# Patient Record
Sex: FEMALE | Race: WHITE | NOT HISPANIC OR LATINO | Employment: OTHER | URBAN - METROPOLITAN AREA
[De-identification: names, ages, dates, MRNs, and addresses within clinical notes are randomized per-mention and may not be internally consistent; named-entity substitution may affect disease eponyms.]

---

## 2017-06-08 ENCOUNTER — HISTORICAL (OUTPATIENT)
Dept: LAB | Facility: HOSPITAL | Age: 67
End: 2017-06-08

## 2017-06-08 LAB — CALCIUM SERPL-MCNC: 9.1 MG/DL (ref 8.5–10.1)

## 2017-06-20 ENCOUNTER — HISTORICAL (OUTPATIENT)
Dept: INFUSION THERAPY | Facility: HOSPITAL | Age: 67
End: 2017-06-20

## 2017-11-09 ENCOUNTER — HISTORICAL (OUTPATIENT)
Dept: LAB | Facility: HOSPITAL | Age: 67
End: 2017-11-09

## 2017-11-09 LAB
ALBUMIN SERPL-MCNC: 3.9 GM/DL (ref 3.4–5)
ALP SERPL-CCNC: 56 UNIT/L (ref 46–116)
ALT SERPL-CCNC: 21 UNIT/L (ref 12–78)
AST SERPL-CCNC: 18 UNIT/L (ref 15–37)
BILIRUB SERPL-MCNC: 0.6 MG/DL (ref 0.2–1)
BILIRUBIN DIRECT+TOT PNL SERPL-MCNC: 0.15 MG/DL (ref 0–0.2)
BILIRUBIN DIRECT+TOT PNL SERPL-MCNC: 0.45 MG/DL (ref 0–0.8)
BUN SERPL-MCNC: 14.3 MG/DL (ref 7–18)
CALCIUM SERPL-MCNC: 9 MG/DL (ref 8.5–10.1)
CHLORIDE SERPL-SCNC: 108 MMOL/L (ref 98–107)
CHOLEST SERPL-MCNC: 174 MG/DL (ref 0–200)
CHOLEST/HDLC SERPL: 2.2 {RATIO} (ref 0–4)
CO2 SERPL-SCNC: 31.1 MMOL/L (ref 21–32)
CREAT SERPL-MCNC: 0.71 MG/DL (ref 0.6–1.3)
DEPRECATED CALCIDIOL+CALCIFEROL SERPL-MC: 27.46 NG/ML (ref 30–80)
ERYTHROCYTE [DISTWIDTH] IN BLOOD BY AUTOMATED COUNT: 12.8 % (ref 11.5–17)
FT4I SERPL CALC-MCNC: 2.34
GLUCOSE SERPL-MCNC: 76 MG/DL (ref 74–106)
HCT VFR BLD AUTO: 42.9 % (ref 37–47)
HDLC SERPL-MCNC: 79 MG/DL (ref 40–60)
HGB BLD-MCNC: 14.2 GM/DL (ref 12–16)
LDLC SERPL CALC-MCNC: 85 MG/DL (ref 0–129)
MCH RBC QN AUTO: 30.4 PG (ref 27–31)
MCHC RBC AUTO-ENTMCNC: 33.1 GM/DL (ref 33–36)
MCV RBC AUTO: 91.8 FL (ref 80–94)
PLATELET # BLD AUTO: 202 X10(3)/MCL (ref 130–400)
PMV BLD AUTO: 10 FL (ref 7.4–10.4)
POTASSIUM SERPL-SCNC: 4.5 MMOL/L (ref 3.5–5.1)
PROT SERPL-MCNC: 6.7 GM/DL (ref 6.4–8.2)
RBC # BLD AUTO: 4.67 X10(6)/MCL (ref 4.2–5.4)
SODIUM SERPL-SCNC: 145 MMOL/L (ref 136–145)
T3RU NFR SERPL: 32 % (ref 31–39)
T4 SERPL-MCNC: 7.3 MCG/DL (ref 4.7–13.3)
TRIGL SERPL-MCNC: 50 MG/DL
TSH SERPL-ACNC: 3.05 MIU/ML (ref 0.36–3.74)
VLDLC SERPL CALC-MCNC: 10 MG/DL
WBC # SPEC AUTO: 4.6 X10(3)/MCL (ref 4.5–11.5)

## 2017-12-26 ENCOUNTER — HISTORICAL (OUTPATIENT)
Dept: INFUSION THERAPY | Facility: HOSPITAL | Age: 67
End: 2017-12-26

## 2017-12-28 ENCOUNTER — HISTORICAL (OUTPATIENT)
Dept: RADIOLOGY | Facility: HOSPITAL | Age: 67
End: 2017-12-28

## 2018-07-26 ENCOUNTER — HISTORICAL (OUTPATIENT)
Dept: LAB | Facility: HOSPITAL | Age: 68
End: 2018-07-26

## 2018-07-26 LAB — CALCIUM SERPL-MCNC: 9 MG/DL (ref 8.5–10.1)

## 2018-08-16 ENCOUNTER — HISTORICAL (OUTPATIENT)
Dept: INFUSION THERAPY | Facility: HOSPITAL | Age: 68
End: 2018-08-16

## 2019-01-10 ENCOUNTER — HISTORICAL (OUTPATIENT)
Dept: LAB | Facility: HOSPITAL | Age: 69
End: 2019-01-10

## 2019-01-10 LAB
ALBUMIN SERPL-MCNC: 3.8 GM/DL (ref 3.4–5)
ALP SERPL-CCNC: 56 UNIT/L (ref 46–116)
ALT SERPL-CCNC: 21 UNIT/L (ref 12–78)
AST SERPL-CCNC: 21 UNIT/L (ref 15–37)
BILIRUB SERPL-MCNC: 0.6 MG/DL (ref 0.2–1)
BILIRUBIN DIRECT+TOT PNL SERPL-MCNC: 0.16 MG/DL (ref 0–0.2)
BILIRUBIN DIRECT+TOT PNL SERPL-MCNC: 0.44 MG/DL (ref 0–0.8)
BUN SERPL-MCNC: 12.4 MG/DL (ref 7–18)
CALCIUM SERPL-MCNC: 9.1 MG/DL (ref 8.5–10.1)
CHLORIDE SERPL-SCNC: 106 MMOL/L (ref 98–107)
CHOLEST SERPL-MCNC: 165 MG/DL (ref 0–200)
CHOLEST/HDLC SERPL: 2 {RATIO} (ref 0–4)
CO2 SERPL-SCNC: 30.7 MMOL/L (ref 21–32)
CREAT SERPL-MCNC: 0.65 MG/DL (ref 0.6–1.3)
CREAT/UREA NIT SERPL: 19
DEPRECATED CALCIDIOL+CALCIFEROL SERPL-MC: 37.83 NG/ML (ref 30–80)
ERYTHROCYTE [DISTWIDTH] IN BLOOD BY AUTOMATED COUNT: 12.9 % (ref 11.5–17)
FT4I SERPL CALC-MCNC: 2.45
GLUCOSE SERPL-MCNC: 85 MG/DL (ref 74–106)
HCT VFR BLD AUTO: 42.9 % (ref 37–47)
HDLC SERPL-MCNC: 81 MG/DL (ref 40–60)
HGB BLD-MCNC: 13.7 GM/DL (ref 12–16)
LDLC SERPL CALC-MCNC: 75 MG/DL (ref 0–129)
MCH RBC QN AUTO: 29.8 PG (ref 27–31)
MCHC RBC AUTO-ENTMCNC: 31.9 GM/DL (ref 33–36)
MCV RBC AUTO: 93.5 FL (ref 80–94)
PLATELET # BLD AUTO: 213 X10(3)/MCL (ref 130–400)
PMV BLD AUTO: 12 FL (ref 9.4–12.4)
POTASSIUM SERPL-SCNC: 4.5 MMOL/L (ref 3.5–5.1)
PROT SERPL-MCNC: 6.7 GM/DL (ref 6.4–8.2)
RBC # BLD AUTO: 4.59 X10(6)/MCL (ref 4.2–5.4)
SODIUM SERPL-SCNC: 144 MMOL/L (ref 136–145)
T3RU NFR SERPL: 34 % (ref 31–39)
T4 SERPL-MCNC: 7.2 MCG/DL (ref 4.7–13.3)
TRIGL SERPL-MCNC: 44 MG/DL
TSH SERPL-ACNC: 2.53 MIU/ML (ref 0.36–3.74)
VLDLC SERPL CALC-MCNC: 9 MG/DL
WBC # SPEC AUTO: 4.9 X10(3)/MCL (ref 4.5–11.5)

## 2019-02-21 ENCOUNTER — HISTORICAL (OUTPATIENT)
Dept: INFUSION THERAPY | Facility: HOSPITAL | Age: 69
End: 2019-02-21

## 2019-07-25 ENCOUNTER — HISTORICAL (OUTPATIENT)
Dept: LAB | Facility: HOSPITAL | Age: 69
End: 2019-07-25

## 2019-07-25 LAB — CALCIUM SERPL-MCNC: 9.2 MG/DL (ref 8.5–10.1)

## 2019-08-22 ENCOUNTER — HISTORICAL (OUTPATIENT)
Dept: INFUSION THERAPY | Facility: HOSPITAL | Age: 69
End: 2019-08-22

## 2020-03-10 ENCOUNTER — HISTORICAL (OUTPATIENT)
Dept: LAB | Facility: HOSPITAL | Age: 70
End: 2020-03-10

## 2020-03-10 LAB — CALCIUM SERPL-MCNC: 9.4 MG/DL (ref 8.5–10.1)

## 2020-04-14 ENCOUNTER — HISTORICAL (OUTPATIENT)
Dept: LAB | Facility: HOSPITAL | Age: 70
End: 2020-04-14

## 2020-04-14 LAB — CALCIUM SERPL-MCNC: 10.3 MG/DL (ref 8.5–10.1)

## 2020-04-16 ENCOUNTER — HISTORICAL (OUTPATIENT)
Dept: INFUSION THERAPY | Facility: HOSPITAL | Age: 70
End: 2020-04-16

## 2020-09-29 ENCOUNTER — HISTORICAL (OUTPATIENT)
Dept: LAB | Facility: HOSPITAL | Age: 70
End: 2020-09-29

## 2020-09-29 LAB — CALCIUM SERPL-MCNC: 9.6 MG/DL (ref 8.5–10.1)

## 2020-10-20 ENCOUNTER — HISTORICAL (OUTPATIENT)
Dept: INFUSION THERAPY | Facility: HOSPITAL | Age: 70
End: 2020-10-20

## 2020-12-05 ENCOUNTER — HISTORICAL (OUTPATIENT)
Dept: LAB | Facility: HOSPITAL | Age: 70
End: 2020-12-05

## 2020-12-05 LAB
ABS NEUT (OLG): 3.17 X10(3)/MCL (ref 2.1–9.2)
ALBUMIN SERPL-MCNC: 4 GM/DL (ref 3.4–4.8)
ALP SERPL-CCNC: 52 UNIT/L (ref 40–150)
ALT SERPL-CCNC: 17 UNIT/L (ref 0–55)
AST SERPL-CCNC: 21 UNIT/L (ref 5–34)
BASOPHILS # BLD AUTO: 0 X10(3)/MCL (ref 0–0.2)
BASOPHILS NFR BLD AUTO: 0 %
BILIRUB SERPL-MCNC: 0.9 MG/DL
BILIRUBIN DIRECT+TOT PNL SERPL-MCNC: 0.3 MG/DL (ref 0–0.5)
BILIRUBIN DIRECT+TOT PNL SERPL-MCNC: 0.6 MG/DL (ref 0–0.8)
BUN SERPL-MCNC: 15 MG/DL (ref 9.8–20.1)
CALCIUM SERPL-MCNC: 9.3 MG/DL (ref 8.4–10.2)
CHLORIDE SERPL-SCNC: 105 MMOL/L (ref 98–107)
CHOLEST SERPL-MCNC: 170 MG/DL
CHOLEST/HDLC SERPL: 2 {RATIO} (ref 0–5)
CO2 SERPL-SCNC: 28 MMOL/L (ref 23–31)
CREAT SERPL-MCNC: 0.78 MG/DL (ref 0.55–1.02)
CREAT/UREA NIT SERPL: 19
DEPRECATED CALCIDIOL+CALCIFEROL SERPL-MC: 28.3 NG/ML (ref 30–80)
EOSINOPHIL # BLD AUTO: 0.1 X10(3)/MCL (ref 0–0.9)
EOSINOPHIL NFR BLD AUTO: 2 %
ERYTHROCYTE [DISTWIDTH] IN BLOOD BY AUTOMATED COUNT: 12.7 % (ref 11.5–17)
FT4I SERPL CALC-MCNC: 2.6 (ref 2.6–3.6)
GLUCOSE SERPL-MCNC: 76 MG/DL (ref 82–115)
HCT VFR BLD AUTO: 44.5 % (ref 37–47)
HDLC SERPL-MCNC: 78 MG/DL (ref 35–60)
HGB BLD-MCNC: 14.1 GM/DL (ref 12–16)
IMM GRANULOCYTES # BLD AUTO: 0.01 % (ref 0–0.02)
IMM GRANULOCYTES NFR BLD AUTO: 0.2 % (ref 0–0.43)
LDLC SERPL CALC-MCNC: 80 MG/DL (ref 50–140)
LYMPHOCYTES # BLD AUTO: 1.4 X10(3)/MCL (ref 0.6–4.6)
LYMPHOCYTES NFR BLD AUTO: 28 %
MCH RBC QN AUTO: 30.1 PG (ref 27–31)
MCHC RBC AUTO-ENTMCNC: 31.7 GM/DL (ref 33–36)
MCV RBC AUTO: 94.9 FL (ref 80–94)
MONOCYTES # BLD AUTO: 0.4 X10(3)/MCL (ref 0.1–1.3)
MONOCYTES NFR BLD AUTO: 8 %
NEUTROPHILS # BLD AUTO: 3.17 X10(3)/MCL (ref 1.4–7.9)
NEUTROPHILS NFR BLD AUTO: 61 %
PLATELET # BLD AUTO: 225 X10(3)/MCL (ref 130–400)
PMV BLD AUTO: 11.7 FL (ref 9.4–12.4)
POTASSIUM SERPL-SCNC: 4.4 MMOL/L (ref 3.5–5.1)
PROT SERPL-MCNC: 6.5 GM/DL (ref 5.8–7.6)
RBC # BLD AUTO: 4.69 X10(6)/MCL (ref 4.2–5.4)
SODIUM SERPL-SCNC: 143 MMOL/L (ref 136–145)
T3RU NFR SERPL: 33.8 % (ref 31–39)
T4 SERPL-MCNC: 7.68 UG/DL (ref 4.87–11.72)
TRIGL SERPL-MCNC: 59 MG/DL (ref 37–140)
TSH SERPL-ACNC: 2.19 UIU/ML (ref 0.35–4.94)
VLDLC SERPL CALC-MCNC: 12 MG/DL
WBC # SPEC AUTO: 5.2 X10(3)/MCL (ref 4.5–11.5)

## 2020-12-10 ENCOUNTER — HISTORICAL (OUTPATIENT)
Dept: RADIOLOGY | Facility: HOSPITAL | Age: 70
End: 2020-12-10

## 2021-04-08 ENCOUNTER — HISTORICAL (OUTPATIENT)
Dept: LAB | Facility: HOSPITAL | Age: 71
End: 2021-04-08

## 2021-04-08 LAB — CALCIUM SERPL-MCNC: 9 MG/DL (ref 8.4–10.2)

## 2021-04-27 ENCOUNTER — HISTORICAL (OUTPATIENT)
Dept: INFUSION THERAPY | Facility: HOSPITAL | Age: 71
End: 2021-04-27

## 2021-10-18 ENCOUNTER — HISTORICAL (OUTPATIENT)
Dept: RADIOLOGY | Facility: HOSPITAL | Age: 71
End: 2021-10-18

## 2021-10-28 ENCOUNTER — HISTORICAL (OUTPATIENT)
Dept: INFUSION THERAPY | Facility: HOSPITAL | Age: 71
End: 2021-10-28

## 2021-12-30 ENCOUNTER — HISTORICAL (OUTPATIENT)
Dept: RADIOLOGY | Facility: HOSPITAL | Age: 71
End: 2021-12-30

## 2022-01-11 ENCOUNTER — HISTORICAL (OUTPATIENT)
Dept: LAB | Facility: HOSPITAL | Age: 72
End: 2022-01-11

## 2022-01-11 LAB
ABS NEUT (OLG): 2.34 X10(3)/MCL (ref 2.1–9.2)
EOSINOPHIL NFR BLD MANUAL: 8 % (ref 0–8)
ERYTHROCYTE [DISTWIDTH] IN BLOOD BY AUTOMATED COUNT: 12.7 % (ref 11.5–17)
HCT VFR BLD AUTO: 44.1 % (ref 37–47)
HGB BLD-MCNC: 14.3 GM/DL (ref 12–16)
LYMPHOCYTES NFR BLD MANUAL: 42 % (ref 13–40)
MCH RBC QN AUTO: 28.9 PG (ref 27–31)
MCHC RBC AUTO-ENTMCNC: 32.4 GM/DL (ref 33–36)
MCV RBC AUTO: 89.3 FL (ref 80–94)
MONOCYTES NFR BLD MANUAL: 7 % (ref 2–11)
NEUTROPHILS NFR BLD MANUAL: 28 % (ref 47–80)
NEUTS BAND NFR BLD MANUAL: 9 % (ref 0–11)
PLATELET # BLD AUTO: 177 X10(3)/MCL (ref 130–400)
PLATELET # BLD EST: ADEQUATE 10*3/UL
PMV BLD AUTO: 10.8 FL (ref 9.4–12.4)
RBC # BLD AUTO: 4.94 X10(6)/MCL (ref 4.2–5.4)
RBC MORPH BLD: NORMAL
WBC # SPEC AUTO: 6.8 X10(3)/MCL (ref 4.5–11.5)

## 2022-03-29 ENCOUNTER — HISTORICAL (OUTPATIENT)
Dept: LAB | Facility: HOSPITAL | Age: 72
End: 2022-03-29

## 2022-03-29 LAB
CALCIUM SERPL-MCNC: 10 MG/DL (ref 8.7–10.5)
HEMOLYSIS INTERF INDEX SERPL-ACNC: 4
ICTERIC INTERF INDEX SERPL-ACNC: 1

## 2022-04-27 DIAGNOSIS — M81.0 OSTEOPOROSIS, POST-MENOPAUSAL: Primary | ICD-10-CM

## 2022-05-03 ENCOUNTER — INFUSION (OUTPATIENT)
Dept: INFUSION THERAPY | Facility: HOSPITAL | Age: 72
End: 2022-05-03
Attending: FAMILY MEDICINE
Payer: MEDICARE

## 2022-05-03 VITALS
RESPIRATION RATE: 16 BRPM | BODY MASS INDEX: 25.27 KG/M2 | HEIGHT: 61 IN | HEART RATE: 71 BPM | TEMPERATURE: 99 F | OXYGEN SATURATION: 99 % | WEIGHT: 133.81 LBS

## 2022-05-03 DIAGNOSIS — M81.0 OSTEOPOROSIS, POST-MENOPAUSAL: ICD-10-CM

## 2022-05-03 DIAGNOSIS — M81.0 OSTEOPOROSIS, POST-MENOPAUSAL: Primary | ICD-10-CM

## 2022-05-03 PROCEDURE — 63600175 PHARM REV CODE 636 W HCPCS: Mod: JG | Performed by: FAMILY MEDICINE

## 2022-05-03 PROCEDURE — 96372 THER/PROPH/DIAG INJ SC/IM: CPT

## 2022-05-03 RX ADMIN — DENOSUMAB 60 MG: 60 INJECTION SUBCUTANEOUS at 09:05

## 2022-07-30 ENCOUNTER — LAB VISIT (OUTPATIENT)
Dept: LAB | Facility: HOSPITAL | Age: 72
End: 2022-07-30
Attending: FAMILY MEDICINE
Payer: MEDICARE

## 2022-07-30 DIAGNOSIS — N18.30 CHRONIC KIDNEY DISEASE, STAGE 3 UNSPECIFIED: ICD-10-CM

## 2022-07-30 DIAGNOSIS — Z00.00 ROUTINE GENERAL MEDICAL EXAMINATION AT A HEALTH CARE FACILITY: ICD-10-CM

## 2022-07-30 DIAGNOSIS — R93.3 ABNORMAL FINDINGS ON DIAGNOSTIC IMAGING OF OTHER PARTS OF DIGESTIVE TRACT: ICD-10-CM

## 2022-07-30 DIAGNOSIS — K21.9 GASTROESOPHAGEAL REFLUX DISEASE, UNSPECIFIED WHETHER ESOPHAGITIS PRESENT: Primary | ICD-10-CM

## 2022-07-30 DIAGNOSIS — R93.89 ABNORMAL FINDINGS ON DIAGNOSTIC IMAGING OF OTHER SPECIFIED BODY STRUCTURES: ICD-10-CM

## 2022-07-30 LAB
ALBUMIN SERPL-MCNC: 3.8 GM/DL (ref 3.4–4.8)
ALP SERPL-CCNC: 57 UNIT/L (ref 40–150)
ALT SERPL-CCNC: 15 UNIT/L (ref 0–55)
ANION GAP SERPL CALC-SCNC: 9 MEQ/L
AST SERPL-CCNC: 20 UNIT/L (ref 5–34)
BASOPHILS # BLD AUTO: 0.02 X10(3)/MCL (ref 0–0.2)
BASOPHILS NFR BLD AUTO: 0.3 %
BILIRUBIN DIRECT+TOT PNL SERPL-MCNC: 0.4 MG/DL (ref 0–0.5)
BILIRUBIN DIRECT+TOT PNL SERPL-MCNC: 0.5 MG/DL (ref 0–0.8)
BILIRUBIN DIRECT+TOT PNL SERPL-MCNC: 0.9 MG/DL
BUN SERPL-MCNC: 16.9 MG/DL (ref 9.8–20.1)
CALCIUM SERPL-MCNC: 9.4 MG/DL (ref 8.4–10.2)
CHLORIDE SERPL-SCNC: 106 MMOL/L (ref 98–107)
CHOLEST SERPL-MCNC: 162 MG/DL
CHOLEST/HDLC SERPL: 3 {RATIO} (ref 0–5)
CO2 SERPL-SCNC: 28 MMOL/L (ref 23–31)
CREAT SERPL-MCNC: 0.79 MG/DL (ref 0.55–1.02)
CREAT/UREA NIT SERPL: 21
DEPRECATED CALCIDIOL+CALCIFEROL SERPL-MC: 40.6 NG/ML (ref 30–80)
EOSINOPHIL # BLD AUTO: 0.42 X10(3)/MCL (ref 0–0.9)
EOSINOPHIL NFR BLD AUTO: 7.3 %
ERYTHROCYTE [DISTWIDTH] IN BLOOD BY AUTOMATED COUNT: 12.8 % (ref 11.5–17)
FT4I SERPL CALC-MCNC: 2.63 (ref 2.6–3.6)
GLUCOSE SERPL-MCNC: 86 MG/DL (ref 82–115)
HCT VFR BLD AUTO: 42.8 % (ref 37–47)
HDLC SERPL-MCNC: 62 MG/DL (ref 35–60)
HGB BLD-MCNC: 13.8 GM/DL (ref 12–16)
IMM GRANULOCYTES # BLD AUTO: 0.05 X10(3)/MCL (ref 0–0.04)
IMM GRANULOCYTES NFR BLD AUTO: 0.9 %
LDLC SERPL CALC-MCNC: 88 MG/DL (ref 50–140)
LYMPHOCYTES # BLD AUTO: 1.99 X10(3)/MCL (ref 0.6–4.6)
LYMPHOCYTES NFR BLD AUTO: 34.6 %
MCH RBC QN AUTO: 30.2 PG (ref 27–31)
MCHC RBC AUTO-ENTMCNC: 32.2 MG/DL (ref 33–36)
MCV RBC AUTO: 93.7 FL (ref 80–94)
MONOCYTES # BLD AUTO: 0.57 X10(3)/MCL (ref 0.1–1.3)
MONOCYTES NFR BLD AUTO: 9.9 %
NEUTROPHILS # BLD AUTO: 2.7 X10(3)/MCL (ref 2.1–9.2)
NEUTROPHILS NFR BLD AUTO: 47 %
PLATELET # BLD AUTO: 202 X10(3)/MCL (ref 130–400)
PMV BLD AUTO: 11.4 FL (ref 7.4–10.4)
POTASSIUM SERPL-SCNC: 5.4 MMOL/L (ref 3.5–5.1)
PROT SERPL-MCNC: 6.6 GM/DL (ref 5.8–7.6)
RBC # BLD AUTO: 4.57 X10(6)/MCL (ref 4.2–5.4)
SODIUM SERPL-SCNC: 143 MMOL/L (ref 136–145)
T3RU NFR SERPL: 39.56 % (ref 31–39)
T4 SERPL-MCNC: 6.64 UG/DL (ref 4.87–11.72)
TRIGL SERPL-MCNC: 59 MG/DL (ref 37–140)
TSH SERPL-ACNC: 3.05 UIU/ML (ref 0.35–4.94)
VLDLC SERPL CALC-MCNC: 12 MG/DL
WBC # SPEC AUTO: 5.8 X10(3)/MCL (ref 4.5–11.5)

## 2022-07-30 PROCEDURE — 84443 ASSAY THYROID STIM HORMONE: CPT

## 2022-07-30 PROCEDURE — 84479 ASSAY OF THYROID (T3 OR T4): CPT

## 2022-07-30 PROCEDURE — 82248 BILIRUBIN DIRECT: CPT

## 2022-07-30 PROCEDURE — 80061 LIPID PANEL: CPT

## 2022-07-30 PROCEDURE — 84436 ASSAY OF TOTAL THYROXINE: CPT

## 2022-07-30 PROCEDURE — 36415 COLL VENOUS BLD VENIPUNCTURE: CPT

## 2022-07-30 PROCEDURE — 82306 VITAMIN D 25 HYDROXY: CPT

## 2022-07-30 PROCEDURE — 80053 COMPREHEN METABOLIC PANEL: CPT

## 2022-07-30 PROCEDURE — 85025 COMPLETE CBC W/AUTO DIFF WBC: CPT

## 2022-12-01 ENCOUNTER — LAB VISIT (OUTPATIENT)
Dept: LAB | Facility: HOSPITAL | Age: 72
End: 2022-12-01
Attending: FAMILY MEDICINE
Payer: MEDICARE

## 2022-12-01 DIAGNOSIS — M81.0 SENILE OSTEOPOROSIS: Primary | ICD-10-CM

## 2022-12-01 LAB — CALCIUM SERPL-MCNC: 10.3 MG/DL (ref 8.4–10.2)

## 2022-12-01 PROCEDURE — 82330 ASSAY OF CALCIUM: CPT

## 2022-12-01 PROCEDURE — 36415 COLL VENOUS BLD VENIPUNCTURE: CPT

## 2022-12-13 ENCOUNTER — INFUSION (OUTPATIENT)
Dept: INFUSION THERAPY | Facility: HOSPITAL | Age: 72
End: 2022-12-13
Attending: FAMILY MEDICINE
Payer: MEDICARE

## 2022-12-13 VITALS
BODY MASS INDEX: 25.27 KG/M2 | WEIGHT: 133.81 LBS | OXYGEN SATURATION: 98 % | SYSTOLIC BLOOD PRESSURE: 178 MMHG | DIASTOLIC BLOOD PRESSURE: 83 MMHG | RESPIRATION RATE: 18 BRPM | HEIGHT: 61 IN | HEART RATE: 67 BPM | TEMPERATURE: 98 F

## 2022-12-13 DIAGNOSIS — M81.0 OSTEOPOROSIS, POST-MENOPAUSAL: Primary | ICD-10-CM

## 2022-12-13 PROCEDURE — 96372 THER/PROPH/DIAG INJ SC/IM: CPT

## 2022-12-13 PROCEDURE — 63600175 PHARM REV CODE 636 W HCPCS: Mod: JG | Performed by: FAMILY MEDICINE

## 2022-12-13 RX ADMIN — DENOSUMAB 60 MG: 60 INJECTION SUBCUTANEOUS at 08:12

## 2023-05-16 ENCOUNTER — HOSPITAL ENCOUNTER (OUTPATIENT)
Dept: RADIOLOGY | Facility: HOSPITAL | Age: 73
Discharge: HOME | End: 2023-05-16
Attending: FAMILY MEDICINE
Payer: MEDICARE

## 2023-05-16 DIAGNOSIS — Z12.31 ENCOUNTER FOR SCREENING MAMMOGRAM FOR BREAST CANCER: ICD-10-CM

## 2023-05-16 PROCEDURE — 77067 SCR MAMMO BI INCL CAD: CPT | Mod: 26,,, | Performed by: RADIOLOGY

## 2023-05-16 PROCEDURE — 77067 SCR MAMMO BI INCL CAD: CPT | Mod: TC

## 2023-05-16 PROCEDURE — 77067 MAMMO DIGITAL SCREENING BILAT WITH TOMO: ICD-10-PCS | Mod: 26,,, | Performed by: RADIOLOGY

## 2023-05-16 PROCEDURE — 77063 MAMMO DIGITAL SCREENING BILAT WITH TOMO: ICD-10-PCS | Mod: 26,,, | Performed by: RADIOLOGY

## 2023-05-16 PROCEDURE — 77063 BREAST TOMOSYNTHESIS BI: CPT | Mod: 26,,, | Performed by: RADIOLOGY

## 2023-06-22 ENCOUNTER — INFUSION (OUTPATIENT)
Dept: INFUSION THERAPY | Facility: HOSPITAL | Age: 73
End: 2023-06-22
Attending: FAMILY MEDICINE
Payer: MEDICARE

## 2023-06-22 VITALS
HEIGHT: 61 IN | WEIGHT: 133.81 LBS | TEMPERATURE: 99 F | HEART RATE: 74 BPM | RESPIRATION RATE: 18 BRPM | SYSTOLIC BLOOD PRESSURE: 132 MMHG | DIASTOLIC BLOOD PRESSURE: 77 MMHG | OXYGEN SATURATION: 97 % | BODY MASS INDEX: 25.27 KG/M2

## 2023-06-22 DIAGNOSIS — M81.0 OSTEOPOROSIS, POST-MENOPAUSAL: Primary | ICD-10-CM

## 2023-06-22 PROCEDURE — 63600175 PHARM REV CODE 636 W HCPCS: Mod: JZ,TB | Performed by: FAMILY MEDICINE

## 2023-06-22 PROCEDURE — 96372 THER/PROPH/DIAG INJ SC/IM: CPT

## 2023-06-22 RX ADMIN — DENOSUMAB 60 MG: 60 INJECTION SUBCUTANEOUS at 10:06

## 2023-06-30 DIAGNOSIS — M25.561 RIGHT KNEE PAIN, UNSPECIFIED CHRONICITY: Primary | ICD-10-CM

## 2023-07-05 ENCOUNTER — OFFICE VISIT (OUTPATIENT)
Dept: ORTHOPEDICS | Facility: CLINIC | Age: 73
End: 2023-07-05
Payer: MEDICARE

## 2023-07-05 VITALS
DIASTOLIC BLOOD PRESSURE: 83 MMHG | BODY MASS INDEX: 26.62 KG/M2 | HEIGHT: 61 IN | SYSTOLIC BLOOD PRESSURE: 158 MMHG | WEIGHT: 141 LBS | HEART RATE: 69 BPM

## 2023-07-05 DIAGNOSIS — M17.11 LOCALIZED OSTEOARTHRITIS OF RIGHT KNEE: Primary | ICD-10-CM

## 2023-07-05 PROCEDURE — 99203 PR OFFICE/OUTPT VISIT, NEW, LEVL III, 30-44 MIN: ICD-10-PCS | Mod: ,,, | Performed by: ORTHOPAEDIC SURGERY

## 2023-07-05 PROCEDURE — 99203 OFFICE O/P NEW LOW 30 MIN: CPT | Mod: ,,, | Performed by: ORTHOPAEDIC SURGERY

## 2023-07-05 RX ORDER — DENOSUMAB 60 MG/ML
60 INJECTION SUBCUTANEOUS
COMMUNITY

## 2023-07-05 RX ORDER — CHOLECALCIFEROL (VITAMIN D3) 125 MCG
5000 CAPSULE ORAL DAILY
COMMUNITY

## 2023-07-05 NOTE — PROGRESS NOTES
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  Patient states he has been having some swelling, pain in the front part of her knee, she states it is not improved over the last couple of months.  She has pain with daily activities including long standing walking and bending.  She is tried rest medication without relief.    ROS: Refer to HPI for pertinent ROS. All other 12 point systems negative.    Objective:  Vitals:    07/05/23 0935   BP: (!) 158/83   Pulse: 69   Weight: 64 kg (141 lb)   Height: 5' 1" (1.549 m)        Physical Exam:  The patient is well-nourished, well-developed and in no apparent distress, pleasant and cooperative. Examination of the right lower extremity compartments are soft and warm. Skin is intact. There are no signs or symptoms of DVT or infection. There is no obvious joint effusion. There is no erythema. Tender to palpation along the medial joint line , right knee range of motion is 5-110. The knee is stable to exam with varus and valgus stressing. Negative anterior and posterior drawer. Negative Lachman´s.  Negative Racnho's test. Patella grind is positive, Negative for apprehension. Neurovascularly intact distally.    Images:  Outside X-rays three views right knee demonstrates arthritic changes. Images Reviewed and discussed with patient.    Assessment:  1. Localized osteoarthritis of right knee        Plan:  At this time we discussed her physical exam and outside x-rays.  We have discussed various treatment options.  We have discussed Mobic with appropriate precautions, we will hold off on injection she was given a pamphlet on knee range of motion strengthening exercises.  I would like see back in 4 " weeks to see how she is progressing.    Follow UP: No follow-ups on file.

## 2023-07-26 ENCOUNTER — OFFICE VISIT (OUTPATIENT)
Dept: ORTHOPEDICS | Facility: CLINIC | Age: 73
End: 2023-07-26
Payer: MEDICARE

## 2023-07-26 VITALS
WEIGHT: 142 LBS | HEIGHT: 61 IN | DIASTOLIC BLOOD PRESSURE: 81 MMHG | HEART RATE: 69 BPM | BODY MASS INDEX: 26.81 KG/M2 | SYSTOLIC BLOOD PRESSURE: 160 MMHG

## 2023-07-26 DIAGNOSIS — M17.11 LOCALIZED OSTEOARTHRITIS OF RIGHT KNEE: Primary | ICD-10-CM

## 2023-07-26 PROCEDURE — 99214 OFFICE O/P EST MOD 30 MIN: CPT | Mod: 25,,, | Performed by: ORTHOPAEDIC SURGERY

## 2023-07-26 PROCEDURE — 20610 DRAIN/INJ JOINT/BURSA W/O US: CPT | Mod: RT,,, | Performed by: ORTHOPAEDIC SURGERY

## 2023-07-26 PROCEDURE — 20610 LARGE JOINT ASPIRATION/INJECTION: R KNEE: ICD-10-PCS | Mod: RT,,, | Performed by: ORTHOPAEDIC SURGERY

## 2023-07-26 PROCEDURE — 99214 PR OFFICE/OUTPT VISIT, EST, LEVL IV, 30-39 MIN: ICD-10-PCS | Mod: 25,,, | Performed by: ORTHOPAEDIC SURGERY

## 2023-07-26 RX ORDER — BETAMETHASONE SODIUM PHOSPHATE AND BETAMETHASONE ACETATE 3; 3 MG/ML; MG/ML
12 INJECTION, SUSPENSION INTRA-ARTICULAR; INTRALESIONAL; INTRAMUSCULAR; SOFT TISSUE
Status: DISCONTINUED | OUTPATIENT
Start: 2023-07-26 | End: 2023-07-26 | Stop reason: HOSPADM

## 2023-07-26 RX ORDER — DICYCLOMINE HYDROCHLORIDE 10 MG/1
CAPSULE ORAL
COMMUNITY
Start: 2023-03-10

## 2023-07-26 RX ORDER — LIDOCAINE HYDROCHLORIDE 20 MG/ML
3 INJECTION, SOLUTION INFILTRATION; PERINEURAL
Status: DISCONTINUED | OUTPATIENT
Start: 2023-07-26 | End: 2023-07-26 | Stop reason: HOSPADM

## 2023-07-26 RX ADMIN — BETAMETHASONE SODIUM PHOSPHATE AND BETAMETHASONE ACETATE 12 MG: 3; 3 INJECTION, SUSPENSION INTRA-ARTICULAR; INTRALESIONAL; INTRAMUSCULAR; SOFT TISSUE at 10:07

## 2023-07-26 RX ADMIN — LIDOCAINE HYDROCHLORIDE 3 MG: 20 INJECTION, SOLUTION INFILTRATION; PERINEURAL at 10:07

## 2023-07-26 NOTE — PROCEDURES
Large Joint Aspiration/Injection: R knee    Date/Time: 7/26/2023 10:30 AM  Performed by: Nolan Pretty MD  Authorized by: Nolan Pretty MD     Consent Done?:  Yes (Verbal)  Indications:  Pain  Site marked: the procedure site was marked    Prep: patient was prepped and draped in usual sterile fashion    Approach:  Anterolateral  Location:  Knee  Site:  R knee  Medications:  12 mg betamethasone acetate-betamethasone sodium phosphate 6 mg/mL; 3 mg LIDOcaine HCL 20 mg/ml (2%) 20 mg/mL (2 %)  Patient tolerance:  Patient tolerated the procedure well with no immediate complications

## 2023-07-26 NOTE — PROGRESS NOTES
"Subjective:    CC: Pain and Injections of the Right Knee and Injections (Rt knee pain, pt requesting cortisone today, pt states hurts some.pt wears brace almost everyday states helps.)       HPI:  Patient returns today for repeat exam.  Patient states she is about to go on her family trip, we would like to try the injection today in her right knee.  She has pain with long standing walking and bending not relieved with rest medication.  She is been using her brace as well.    ROS: Refer to HPI for pertinent ROS. All other 12 point systems negative.    Objective:  Vitals:    07/26/23 1106   BP: (!) 160/81   BP Location: Right arm   Patient Position: Sitting   BP Method: Medium (Automatic)   Pulse: 69   Weight: 64.4 kg (142 lb)   Height: 5' 1" (1.549 m)        Physical Exam:  The patient is well-nourished, well-developed and in no apparent distress, pleasant and cooperative. Examination of the right lower extremity compartments are soft and warm. Skin is intact. There are no signs or symptoms of DVT or infection. There is no obvious joint effusion. There is no erythema. Tender to palpation along the medial joint line and patellofemoral joint , right knee range of motion is 0-110. The knee is stable to exam with varus and valgus stressing. Negative anterior and posterior drawer. Negative Lachman´s.  Negative Rancho's test. Patella grind is positive, Negative for apprehension. Neurovascularly intact distally.    Images: . Images Reviewed and discussed with patient.    Assessment:  1. Localized osteoarthritis of right knee  - Large Joint Aspiration/Injection: R knee  - LIDOcaine HCL 20 mg/ml (2%) injection 3 mg  - betamethasone acetate-betamethasone sodium phosphate injection 12 mg        Plan:  At this time we discussed her physical exam and previous imaging findings.  We have discussed low-impact activities, knee exercises, anti-inflammatories with appropriate precautions.  She tolerated her steroid injection very " well to the right knee, she will continue her with her brace as needed.  I would like see her back in 3 months if needed.    Follow UP: Follow up in about 3 months (around 10/26/2023).    Large Joint Aspiration/Injection: R knee    Date/Time: 7/26/2023 10:30 AM  Performed by: Nolan Pretty MD  Authorized by: Nolan Pretty MD     Consent Done?:  Yes (Verbal)  Indications:  Pain  Site marked: the procedure site was marked    Prep: patient was prepped and draped in usual sterile fashion    Approach:  Anterolateral  Location:  Knee  Site:  R knee  Medications:  12 mg betamethasone acetate-betamethasone sodium phosphate 6 mg/mL; 3 mg LIDOcaine HCL 20 mg/ml (2%) 20 mg/mL (2 %)  Patient tolerance:  Patient tolerated the procedure well with no immediate complications

## 2023-11-25 ENCOUNTER — LAB VISIT (OUTPATIENT)
Dept: LAB | Facility: HOSPITAL | Age: 73
End: 2023-11-25
Attending: FAMILY MEDICINE
Payer: MEDICARE

## 2023-11-25 DIAGNOSIS — Z00.00 ROUTINE GENERAL MEDICAL EXAMINATION AT A HEALTH CARE FACILITY: Primary | ICD-10-CM

## 2023-11-25 DIAGNOSIS — Z92.29 HEPATITIS B NON-CONVERTER (POST-VACCINATION): ICD-10-CM

## 2023-11-25 DIAGNOSIS — M50.30 DEGENERATION OF CERVICAL INTERVERTEBRAL DISC: ICD-10-CM

## 2023-11-25 DIAGNOSIS — M81.0 SENILE OSTEOPOROSIS: ICD-10-CM

## 2023-11-25 DIAGNOSIS — Z87.891 PERSONAL HISTORY OF TOBACCO USE, PRESENTING HAZARDS TO HEALTH: ICD-10-CM

## 2023-11-25 DIAGNOSIS — K21.9 GASTROESOPHAGEAL REFLUX DISEASE, UNSPECIFIED WHETHER ESOPHAGITIS PRESENT: ICD-10-CM

## 2023-11-25 DIAGNOSIS — H35.30 SENILE MACULAR RETINAL DEGENERATION: ICD-10-CM

## 2023-11-25 DIAGNOSIS — M54.16 LUMBAR RADICULOPATHY: ICD-10-CM

## 2023-11-25 DIAGNOSIS — E55.9 AVITAMINOSIS D: ICD-10-CM

## 2023-11-25 DIAGNOSIS — Z79.899 ENCOUNTER FOR LONG-TERM (CURRENT) USE OF OTHER MEDICATIONS: ICD-10-CM

## 2023-11-25 DIAGNOSIS — L65.9 BALDNESS: ICD-10-CM

## 2023-11-25 LAB
ALBUMIN SERPL-MCNC: 4 G/DL (ref 3.4–4.8)
ALP SERPL-CCNC: 61 UNIT/L (ref 40–150)
ALT SERPL-CCNC: 17 UNIT/L (ref 0–55)
ANION GAP SERPL CALC-SCNC: 10 MEQ/L
AST SERPL-CCNC: 24 UNIT/L (ref 5–34)
BILIRUB SERPL-MCNC: 0.6 MG/DL
BILIRUBIN DIRECT+TOT PNL SERPL-MCNC: 0.2 MG/DL (ref 0–?)
BILIRUBIN DIRECT+TOT PNL SERPL-MCNC: 0.4 MG/DL (ref 0–0.8)
BUN SERPL-MCNC: 11.2 MG/DL (ref 9.8–20.1)
CALCIUM SERPL-MCNC: 9.1 MG/DL (ref 8.4–10.2)
CHLORIDE SERPL-SCNC: 107 MMOL/L (ref 98–107)
CHOLEST SERPL-MCNC: 177 MG/DL
CHOLEST/HDLC SERPL: 3 {RATIO} (ref 0–5)
CO2 SERPL-SCNC: 27 MMOL/L (ref 23–31)
CREAT SERPL-MCNC: 0.69 MG/DL (ref 0.55–1.02)
CREAT/UREA NIT SERPL: 16
DEPRECATED CALCIDIOL+CALCIFEROL SERPL-MC: 49.4 NG/ML (ref 30–80)
ERYTHROCYTE [DISTWIDTH] IN BLOOD BY AUTOMATED COUNT: 12.3 % (ref 11.5–17)
FT4I SERPL CALC-MCNC: 2.62 (ref 2.6–3.6)
GFR SERPLBLD CREATININE-BSD FMLA CKD-EPI: >60 MLS/MIN/1.73/M2
GLUCOSE SERPL-MCNC: 83 MG/DL (ref 82–115)
HCT VFR BLD AUTO: 45.1 % (ref 37–47)
HDLC SERPL-MCNC: 70 MG/DL (ref 35–60)
HGB BLD-MCNC: 14.3 G/DL (ref 12–16)
LDLC SERPL CALC-MCNC: 96 MG/DL (ref 50–140)
MCH RBC QN AUTO: 29.7 PG (ref 27–31)
MCHC RBC AUTO-ENTMCNC: 31.7 G/DL (ref 33–36)
MCV RBC AUTO: 93.8 FL (ref 80–94)
PLATELET # BLD AUTO: 221 X10(3)/MCL (ref 130–400)
PMV BLD AUTO: 11.9 FL (ref 7.4–10.4)
POTASSIUM SERPL-SCNC: 4.3 MMOL/L (ref 3.5–5.1)
PROT SERPL-MCNC: 7.1 GM/DL (ref 5.8–7.6)
RBC # BLD AUTO: 4.81 X10(6)/MCL (ref 4.2–5.4)
SODIUM SERPL-SCNC: 144 MMOL/L (ref 136–145)
T3RU NFR SERPL: 34.06 % (ref 31–39)
T4 SERPL-MCNC: 7.69 UG/DL (ref 4.87–11.72)
TRIGL SERPL-MCNC: 54 MG/DL (ref 37–140)
TSH SERPL-ACNC: 2.27 UIU/ML (ref 0.35–4.94)
VLDLC SERPL CALC-MCNC: 11 MG/DL
WBC # SPEC AUTO: 5.8 X10(3)/MCL (ref 4.5–11.5)

## 2023-11-25 PROCEDURE — 80061 LIPID PANEL: CPT

## 2023-11-25 PROCEDURE — 36415 COLL VENOUS BLD VENIPUNCTURE: CPT

## 2023-11-25 PROCEDURE — 82306 VITAMIN D 25 HYDROXY: CPT

## 2023-11-25 PROCEDURE — 80048 BASIC METABOLIC PNL TOTAL CA: CPT

## 2023-11-25 PROCEDURE — 80076 HEPATIC FUNCTION PANEL: CPT

## 2023-11-25 PROCEDURE — 84436 ASSAY OF TOTAL THYROXINE: CPT

## 2023-11-25 PROCEDURE — 84443 ASSAY THYROID STIM HORMONE: CPT

## 2023-11-25 PROCEDURE — 85027 COMPLETE CBC AUTOMATED: CPT

## 2023-12-12 DIAGNOSIS — M81.0 OSTEOPOROSIS, POST-MENOPAUSAL: Primary | ICD-10-CM

## 2023-12-19 ENCOUNTER — HOSPITAL ENCOUNTER (OUTPATIENT)
Dept: RADIOLOGY | Facility: HOSPITAL | Age: 73
Discharge: HOME | End: 2023-12-19
Attending: FAMILY MEDICINE
Payer: MEDICARE

## 2023-12-19 DIAGNOSIS — M81.0 OSTEOPOROSIS, POST-MENOPAUSAL: ICD-10-CM

## 2023-12-19 PROCEDURE — 77080 DXA BONE DENSITY AXIAL: CPT | Mod: TC

## 2023-12-28 ENCOUNTER — INFUSION (OUTPATIENT)
Dept: INFUSION THERAPY | Facility: HOSPITAL | Age: 73
End: 2023-12-28
Attending: FAMILY MEDICINE
Payer: MEDICARE

## 2023-12-28 VITALS
RESPIRATION RATE: 18 BRPM | TEMPERATURE: 98 F | HEIGHT: 61 IN | DIASTOLIC BLOOD PRESSURE: 84 MMHG | OXYGEN SATURATION: 97 % | HEART RATE: 76 BPM | BODY MASS INDEX: 26.81 KG/M2 | SYSTOLIC BLOOD PRESSURE: 160 MMHG | WEIGHT: 142 LBS

## 2023-12-28 DIAGNOSIS — M81.0 OSTEOPOROSIS, POST-MENOPAUSAL: Primary | ICD-10-CM

## 2023-12-28 PROCEDURE — 63600175 PHARM REV CODE 636 W HCPCS: Mod: JZ,TB | Performed by: FAMILY MEDICINE

## 2023-12-28 PROCEDURE — 96372 THER/PROPH/DIAG INJ SC/IM: CPT

## 2023-12-28 RX ADMIN — DENOSUMAB 60 MG: 60 INJECTION SUBCUTANEOUS at 10:12

## 2024-02-19 ENCOUNTER — OFFICE VISIT (OUTPATIENT)
Dept: ORTHOPEDICS | Facility: CLINIC | Age: 74
End: 2024-02-19
Payer: MEDICARE

## 2024-02-19 DIAGNOSIS — M25.561 RIGHT KNEE PAIN, UNSPECIFIED CHRONICITY: Primary | ICD-10-CM

## 2024-02-19 DIAGNOSIS — M17.11 LOCALIZED OSTEOARTHRITIS OF RIGHT KNEE: ICD-10-CM

## 2024-02-19 PROCEDURE — 20610 DRAIN/INJ JOINT/BURSA W/O US: CPT | Mod: RT,,, | Performed by: ORTHOPAEDIC SURGERY

## 2024-02-19 PROCEDURE — 99214 OFFICE O/P EST MOD 30 MIN: CPT | Mod: 25,,, | Performed by: ORTHOPAEDIC SURGERY

## 2024-02-19 RX ORDER — METHYLPREDNISOLONE ACETATE 80 MG/ML
80 INJECTION, SUSPENSION INTRA-ARTICULAR; INTRALESIONAL; INTRAMUSCULAR; SOFT TISSUE
Status: DISCONTINUED | OUTPATIENT
Start: 2024-02-19 | End: 2024-02-19 | Stop reason: HOSPADM

## 2024-02-19 RX ORDER — LIDOCAINE HYDROCHLORIDE 20 MG/ML
3 INJECTION, SOLUTION INFILTRATION; PERINEURAL
Status: DISCONTINUED | OUTPATIENT
Start: 2024-02-19 | End: 2024-02-19 | Stop reason: HOSPADM

## 2024-02-19 RX ADMIN — LIDOCAINE HYDROCHLORIDE 3 MG: 20 INJECTION, SOLUTION INFILTRATION; PERINEURAL at 03:02

## 2024-02-19 RX ADMIN — METHYLPREDNISOLONE ACETATE 80 MG: 80 INJECTION, SUSPENSION INTRA-ARTICULAR; INTRALESIONAL; INTRAMUSCULAR; SOFT TISSUE at 03:02

## 2024-02-19 NOTE — PROCEDURES
Large Joint Aspiration/Injection: R knee    Date/Time: 2/19/2024 3:30 PM    Performed by: Nolan Pretty MD  Authorized by: Nolan Pretty MD    Consent Done?:  Yes (Verbal)  Indications:  Pain  Site marked: the procedure site was marked    Prep: patient was prepped and draped in usual sterile fashion    Approach:  Anterolateral  Location:  Knee  Site:  R knee  Medications:  3 mg LIDOcaine HCL 20 mg/ml (2%) 20 mg/mL (2 %); 80 mg methylPREDNISolone acetate 80 mg/mL  Patient tolerance:  Patient tolerated the procedure well with no immediate complications

## 2024-02-19 NOTE — PROGRESS NOTES
Subjective:    CC: Follow-up of the Right Knee (F/U for right knee injection 7/26/23. Injection helped a lot and wants another one today.)       HPI:  Patient returns today for repeat exam.  Patient states her right knee pain has returned.  She has been having some pain with long standing walking and bending, getting up from a seated chair.  She would like to try the injection.  She does have a planned cruise coming up next month    ROS: Refer to HPI for pertinent ROS. All other 12 point systems negative.    Objective:  There were no vitals filed for this visit.     Physical Exam:  The patient is well-nourished, well-developed and in no apparent distress, pleasant and cooperative. Examination of the right lower extremity compartments are soft and warm. Skin is intact. There are no signs or symptoms of DVT or infection. There is a small joint effusion. There is no erythema. Tender to palpation along the medial joint line and patellofemoral joint , right knee range of motion is 0-110. The knee is stable to exam with varus and valgus stressing. Negative anterior and posterior drawer. Negative Lachman´s.  Negative Rancho's test. Patella grind is positive, Negative for apprehension. Neurovascularly intact distally.    Images:  X-rays three views of the right knee demonstrates underlying arthritic changes. Images Reviewed and discussed with patient.    Assessment:  1. Right knee pain, unspecified chronicity  - X-Ray Knee 3 View Right; Future    2. Localized osteoarthritis of right knee  - Large Joint Aspiration/Injection: R knee        Plan:  At this time we discussed her physical exam and x-ray findings.  She tolerated her steroid injection very well to the right knee we have discussed low-impact activities, knee exercises.  I would like see her back with any problems or difficulties.    Follow UP: No follow-ups on file.    Large Joint Aspiration/Injection: R knee    Date/Time: 2/19/2024 3:30 PM    Performed by:  Nolan Pretty MD  Authorized by: Nolan Pretty MD    Consent Done?:  Yes (Verbal)  Indications:  Pain  Site marked: the procedure site was marked    Prep: patient was prepped and draped in usual sterile fashion    Approach:  Anterolateral  Location:  Knee  Site:  R knee  Medications:  3 mg LIDOcaine HCL 20 mg/ml (2%) 20 mg/mL (2 %); 80 mg methylPREDNISolone acetate 80 mg/mL  Patient tolerance:  Patient tolerated the procedure well with no immediate complications

## 2024-05-21 ENCOUNTER — HOSPITAL ENCOUNTER (OUTPATIENT)
Dept: RADIOLOGY | Facility: HOSPITAL | Age: 74
Discharge: HOME | End: 2024-05-21
Attending: FAMILY MEDICINE
Payer: MEDICARE

## 2024-05-21 DIAGNOSIS — Z12.31 SCREENING MAMMOGRAM, ENCOUNTER FOR: ICD-10-CM

## 2024-05-21 PROCEDURE — 77063 BREAST TOMOSYNTHESIS BI: CPT | Mod: 26,,, | Performed by: RADIOLOGY

## 2024-05-21 PROCEDURE — 77067 SCR MAMMO BI INCL CAD: CPT | Mod: TC

## 2024-05-21 PROCEDURE — 77067 SCR MAMMO BI INCL CAD: CPT | Mod: 26,,, | Performed by: RADIOLOGY

## 2024-06-13 ENCOUNTER — LAB VISIT (OUTPATIENT)
Dept: LAB | Facility: HOSPITAL | Age: 74
End: 2024-06-13
Attending: FAMILY MEDICINE
Payer: MEDICARE

## 2024-06-13 DIAGNOSIS — M81.0 SENILE OSTEOPOROSIS: Primary | ICD-10-CM

## 2024-06-13 LAB — CALCIUM SERPL-MCNC: 9.3 MG/DL (ref 8.4–10.2)

## 2024-06-13 PROCEDURE — 82310 ASSAY OF CALCIUM: CPT

## 2024-06-13 PROCEDURE — 36415 COLL VENOUS BLD VENIPUNCTURE: CPT

## 2024-07-02 ENCOUNTER — INFUSION (OUTPATIENT)
Dept: INFUSION THERAPY | Facility: HOSPITAL | Age: 74
End: 2024-07-02
Attending: FAMILY MEDICINE
Payer: MEDICARE

## 2024-07-02 VITALS
OXYGEN SATURATION: 98 % | HEART RATE: 77 BPM | SYSTOLIC BLOOD PRESSURE: 152 MMHG | BODY MASS INDEX: 26.81 KG/M2 | DIASTOLIC BLOOD PRESSURE: 82 MMHG | RESPIRATION RATE: 18 BRPM | HEIGHT: 61 IN | WEIGHT: 142 LBS | TEMPERATURE: 98 F

## 2024-07-02 DIAGNOSIS — M81.0 OSTEOPOROSIS, POST-MENOPAUSAL: Primary | ICD-10-CM

## 2024-07-02 PROCEDURE — 63600175 PHARM REV CODE 636 W HCPCS: Mod: JZ,TB | Performed by: FAMILY MEDICINE

## 2024-07-02 PROCEDURE — 96372 THER/PROPH/DIAG INJ SC/IM: CPT

## 2024-07-02 RX ADMIN — DENOSUMAB 60 MG: 60 INJECTION SUBCUTANEOUS at 09:07

## 2024-08-14 ENCOUNTER — OFFICE VISIT (OUTPATIENT)
Dept: ORTHOPEDICS | Facility: CLINIC | Age: 74
End: 2024-08-14
Payer: MEDICARE

## 2024-08-14 DIAGNOSIS — M76.32 IT BAND SYNDROME, LEFT: ICD-10-CM

## 2024-08-14 DIAGNOSIS — M17.11 LOCALIZED OSTEOARTHRITIS OF RIGHT KNEE: ICD-10-CM

## 2024-08-14 DIAGNOSIS — M25.562 LEFT KNEE PAIN, UNSPECIFIED CHRONICITY: Primary | ICD-10-CM

## 2024-08-14 DIAGNOSIS — M17.0 BILATERAL PRIMARY OSTEOARTHRITIS OF KNEE: ICD-10-CM

## 2024-08-14 RX ORDER — MELOXICAM 15 MG/1
15 TABLET ORAL DAILY PRN
Qty: 30 TABLET | Refills: 0 | Status: SHIPPED | OUTPATIENT
Start: 2024-08-14

## 2024-08-14 RX ORDER — BETAMETHASONE SODIUM PHOSPHATE AND BETAMETHASONE ACETATE 3; 3 MG/ML; MG/ML
12 INJECTION, SUSPENSION INTRA-ARTICULAR; INTRALESIONAL; INTRAMUSCULAR; SOFT TISSUE
Status: DISCONTINUED | OUTPATIENT
Start: 2024-08-14 | End: 2024-08-14 | Stop reason: HOSPADM

## 2024-08-14 RX ORDER — LIDOCAINE HYDROCHLORIDE 10 MG/ML
2 INJECTION, SOLUTION INFILTRATION; PERINEURAL
Status: DISCONTINUED | OUTPATIENT
Start: 2024-08-14 | End: 2024-08-14 | Stop reason: HOSPADM

## 2024-08-14 RX ADMIN — BETAMETHASONE SODIUM PHOSPHATE AND BETAMETHASONE ACETATE 12 MG: 3; 3 INJECTION, SUSPENSION INTRA-ARTICULAR; INTRALESIONAL; INTRAMUSCULAR; SOFT TISSUE at 10:08

## 2024-08-14 RX ADMIN — LIDOCAINE HYDROCHLORIDE 2 ML: 10 INJECTION, SOLUTION INFILTRATION; PERINEURAL at 10:08

## 2024-08-14 NOTE — PROCEDURES
Large Joint Aspiration/Injection: L knee    Date/Time: 8/14/2024 10:30 AM    Performed by: Juanita Guzman PA-C  Authorized by: Juanita Guzman PA-C    Consent Done?:  Yes (Verbal)  Indications:  Pain  Site marked: the procedure site was marked    Prep: patient was prepped and draped in usual sterile fashion    Approach:  Anterolateral  Location:  Knee  Site:  L knee  Medications:  2 mL LIDOcaine HCL 10 mg/ml (1%) 10 mg/mL (1 %); 12 mg betamethasone acetate-betamethasone sodium phosphate 6 mg/mL  Patient tolerance:  Patient tolerated the procedure well with no immediate complications

## 2024-08-14 NOTE — PROGRESS NOTES
Subjective:    CC: Pain of the Left Knee and Pain of the Right Knee (F/U ROMEL knee pain. Pt states knees were doing ok up until a couple weeks ago. Some swelling. Mainly hurts at the end of the day. R is worse than the L. No difficulty with mobility. No other concerns with them)       HPI:  Patient presents to clinic for evaluation of bilateral knee pain.  Patient states that her right knee continues to be her worst knee.  More recently she has been struggling with left lateral knee pain.  She states she feels she walks with a waddling to compensate for her right knee pain.  She will occasionally have swelling.  Taking Tylenol or ibuprofen when she remembers to.  She states she is very active with her grandchildren.  She has continues to have good range of motion.  She denies any numbness, tingling, popping.  Feels that most of her pain is at the end of the day.  She has had a previous right knee injection in February that worked very well for approximately 5 months.  She is interested in repeating this today.    ROS: Refer to HPI for pertinent ROS. All other 12 point systems negative.    History reviewed. No pertinent past medical history.     History reviewed. No pertinent surgical history.     Current Outpatient Medications on File Prior to Visit   Medication Sig Dispense Refill    cholecalciferol, vitamin D3, 125 mcg (5,000 unit) capsule Take 5,000 Units by mouth once daily.      denosumab (PROLIA) 60 mg/mL Syrg Inject 60 mg into the skin.      dicyclomine (BENTYL) 10 MG capsule        No current facility-administered medications on file prior to visit.        Review of patient's allergies indicates:   Allergen Reactions    Clindamycin hcl      Other reaction(s): colitis       Objective:    There were no vitals filed for this visit.     Physical Exam:  The patient is well-nourished, well-developed, in no apparent distress, pleasant and cooperative. Examination of the bilateral lower extremities,  compartments are  soft and warm. Skin is intact. There are no signs or symptoms of DVT or infection. There is a mild right knee joint effusion. There is no erythema. Tenderness to palpation along the anterior joint line of the right knee, tender to palpation about the IT band of the left knee with most pain about the insertion, right knee range of motion is 0-115; left knee range of motion is 0-120. The knee is stable to stressing with varus and valgus. Negative anterior and posterior drawer.   Negative Lachman´s. negative Rancho's test. Patella grind is positive, negative for apprehension.  Patient is neurovascularly intact distally.    Images:  X-rays: Three views of the left knee demonstrate mild underlying arthritis.  No obvious fracture or dislocation.  Images Reviewed and discussed with patient.    Assessment:  1. Left knee pain, unspecified chronicity  - X-Ray Knee 3 View Left; Future    2. It band syndrome, left  - Ambulatory referral/consult to Physical/Occupational Therapy; Future  - meloxicam (MOBIC) 15 MG tablet; Take 1 tablet (15 mg total) by mouth daily as needed.  Dispense: 30 tablet; Refill: 0    3. Localized osteoarthritis of right knee  - Ambulatory referral/consult to Physical/Occupational Therapy; Future  - meloxicam (MOBIC) 15 MG tablet; Take 1 tablet (15 mg total) by mouth daily as needed.  Dispense: 30 tablet; Refill: 0  - Large Joint Aspiration/Injection: L knee  - LIDOcaine HCL 10 mg/ml (1%) injection 2 mL  - betamethasone acetate-betamethasone sodium phosphate injection 12 mg    4. Bilateral primary osteoarthritis of knee       Plan:  Physical exam and imaging findings discussed with patient.  She tolerated a right knee steroid injection well today in clinic.  We did discuss her mild underlying arthritis of the left knee is well however she is most symptomatic today about her IT band.  We will she was given a prescription for formal physical therapy.  Patient was also given a prescription of Mobic with  appropriate precautions.  She understands to discontinue other anti-inflammatories use.  I would like to see the patient back in 3 months to assess her progress.    Follow up: Follow up in about 3 months (around 11/14/2024).    Large Joint Aspiration/Injection: L knee    Date/Time: 8/14/2024 10:30 AM    Performed by: Juanita Guzman PA-C  Authorized by: Juanita Guzman PA-C    Consent Done?:  Yes (Verbal)  Indications:  Pain  Site marked: the procedure site was marked    Prep: patient was prepped and draped in usual sterile fashion    Approach:  Anterolateral  Location:  Knee  Site:  L knee  Medications:  2 mL LIDOcaine HCL 10 mg/ml (1%) 10 mg/mL (1 %); 12 mg betamethasone acetate-betamethasone sodium phosphate 6 mg/mL  Patient tolerance:  Patient tolerated the procedure well with no immediate complications

## 2024-11-13 ENCOUNTER — OFFICE VISIT (OUTPATIENT)
Dept: ORTHOPEDICS | Facility: CLINIC | Age: 74
End: 2024-11-13
Payer: MEDICARE

## 2024-11-13 DIAGNOSIS — M17.0 BILATERAL PRIMARY OSTEOARTHRITIS OF KNEE: Primary | ICD-10-CM

## 2024-11-13 PROCEDURE — 99213 OFFICE O/P EST LOW 20 MIN: CPT | Mod: ,,,

## 2024-11-13 NOTE — PROGRESS NOTES
Subjective:    CC: Follow-up of the Left Knee (F/U L knee inj 8/14/24. Pt states knee is doing great. Injection helped and doing home exercises. Knee has been gaining strength. Not requesting another injection today.)       HPI:  Patient presents to clinic for repeat evaluation.  Status post left knee steroid injection on 08/14/2024.  Patient states that both of her knees are doing very well today.  The injection helped greatly.  She also feels that formal physical therapy as well as her at-home exercise program has significantly improved her strength and range of motion.  She does not feel she needs a repeat injection today.  She states she only took the Mobic twice and does not feel like she needs any pain medication at current.  Ambulating unassisted.  No new complaints.    ROS: Refer to HPI for pertinent ROS. All other 12 point systems negative.    History reviewed. No pertinent past medical history.     History reviewed. No pertinent surgical history.     Current Outpatient Medications on File Prior to Visit   Medication Sig Dispense Refill    cholecalciferol, vitamin D3, 125 mcg (5,000 unit) capsule Take 5,000 Units by mouth once daily.      denosumab (PROLIA) 60 mg/mL Syrg Inject 60 mg into the skin.      dicyclomine (BENTYL) 10 MG capsule       meloxicam (MOBIC) 15 MG tablet Take 1 tablet (15 mg total) by mouth daily as needed. 30 tablet 0     No current facility-administered medications on file prior to visit.        Review of patient's allergies indicates:   Allergen Reactions    Clindamycin hcl      Other reaction(s): colitis       Objective:    There were no vitals filed for this visit.     Physical Exam: Examination of the bilateral lower extremities,  compartments are soft and warm. Skin is intact. There are no signs or symptoms of DVT or infection. There is a mild right knee joint effusion. There is no erythema. Tenderness to palpation along the anterior joint line minimally if any bilaterally. right  knee range of motion is 0-115; left knee range of motion is 0-120. The knee is stable to stressing with varus and valgus. Negative anterior and posterior drawer.   Negative Lachman´s. negative Rancho's test. Patella grind is positive, negative for apprehension.  Patient is neurovascularly intact distally.     Images:  previous Images Reviewed and discussed with patient.    Assessment:  1. Bilateral primary osteoarthritis of knee       Plan:  Physical exam and previous imaging findings again discussed with the patient.  She has done very well with conservative treatment in his overall asymptomatic today.  She will continue low-impact activities and OTC anti-inflammatories as needed with appropriate precautions.  She will finish out formal physical therapy.  I would like to see the patient back with any problems or difficulties.    Follow up: Follow up if symptoms worsen or fail to improve.

## 2024-12-14 ENCOUNTER — LAB VISIT (OUTPATIENT)
Dept: LAB | Facility: HOSPITAL | Age: 74
End: 2024-12-14
Attending: FAMILY MEDICINE
Payer: MEDICARE

## 2024-12-14 DIAGNOSIS — K21.9 GASTROESOPHAGEAL REFLUX DISEASE, UNSPECIFIED WHETHER ESOPHAGITIS PRESENT: ICD-10-CM

## 2024-12-14 DIAGNOSIS — H35.30 SENILE MACULAR RETINAL DEGENERATION: ICD-10-CM

## 2024-12-14 DIAGNOSIS — E55.9 AVITAMINOSIS D: ICD-10-CM

## 2024-12-14 DIAGNOSIS — Z79.899 NEED FOR PROPHYLACTIC CHEMOTHERAPY: ICD-10-CM

## 2024-12-14 DIAGNOSIS — M81.0 SENILE OSTEOPOROSIS: ICD-10-CM

## 2024-12-14 DIAGNOSIS — Z00.00 ROUTINE GENERAL MEDICAL EXAMINATION AT A HEALTH CARE FACILITY: Primary | ICD-10-CM

## 2024-12-14 LAB
25(OH)D3+25(OH)D2 SERPL-MCNC: 40 NG/ML (ref 30–80)
ALBUMIN SERPL-MCNC: 3.9 G/DL (ref 3.4–4.8)
ALP SERPL-CCNC: 75 UNIT/L (ref 40–150)
ALT SERPL-CCNC: 13 UNIT/L (ref 0–55)
ANION GAP SERPL CALC-SCNC: 9 MEQ/L
AST SERPL-CCNC: 19 UNIT/L (ref 5–34)
BILIRUB DIRECT SERPL-MCNC: 0.5 MG/DL (ref 0–?)
BILIRUB SERPL-MCNC: 1.5 MG/DL
BILIRUBIN DIRECT+TOT PNL SERPL-MCNC: 1 MG/DL (ref 0–0.8)
BUN SERPL-MCNC: 9.5 MG/DL (ref 9.8–20.1)
CALCIUM SERPL-MCNC: 9.5 MG/DL (ref 8.4–10.2)
CHLORIDE SERPL-SCNC: 105 MMOL/L (ref 98–107)
CHOLEST SERPL-MCNC: 172 MG/DL
CHOLEST/HDLC SERPL: 3 {RATIO} (ref 0–5)
CO2 SERPL-SCNC: 27 MMOL/L (ref 23–31)
CREAT SERPL-MCNC: 0.74 MG/DL (ref 0.55–1.02)
CREAT/UREA NIT SERPL: 13
ERYTHROCYTE [DISTWIDTH] IN BLOOD BY AUTOMATED COUNT: 12.2 % (ref 11.5–17)
GFR SERPLBLD CREATININE-BSD FMLA CKD-EPI: >60 ML/MIN/1.73/M2
GLUCOSE SERPL-MCNC: 86 MG/DL (ref 82–115)
HCT VFR BLD AUTO: 45.2 % (ref 37–47)
HDLC SERPL-MCNC: 68 MG/DL (ref 35–60)
HGB BLD-MCNC: 14.9 G/DL (ref 12–16)
LDLC SERPL CALC-MCNC: 89 MG/DL (ref 50–140)
MCH RBC QN AUTO: 31 PG (ref 27–31)
MCHC RBC AUTO-ENTMCNC: 33 G/DL (ref 33–36)
MCV RBC AUTO: 94.2 FL (ref 80–94)
PLATELET # BLD AUTO: 199 X10(3)/MCL (ref 130–400)
PMV BLD AUTO: 12.4 FL (ref 7.4–10.4)
POTASSIUM SERPL-SCNC: 4.8 MMOL/L (ref 3.5–5.1)
PROT SERPL-MCNC: 7 GM/DL (ref 5.8–7.6)
RBC # BLD AUTO: 4.8 X10(6)/MCL (ref 4.2–5.4)
SODIUM SERPL-SCNC: 141 MMOL/L (ref 136–145)
T4 FREE SERPL-MCNC: 0.83 NG/DL (ref 0.7–1.48)
TRIGL SERPL-MCNC: 74 MG/DL (ref 37–140)
TSH SERPL-ACNC: 2.9 UIU/ML (ref 0.35–4.94)
VLDLC SERPL CALC-MCNC: 15 MG/DL
WBC # BLD AUTO: 6.3 X10(3)/MCL (ref 4.5–11.5)

## 2024-12-14 PROCEDURE — 84439 ASSAY OF FREE THYROXINE: CPT

## 2024-12-14 PROCEDURE — 80048 BASIC METABOLIC PNL TOTAL CA: CPT

## 2024-12-14 PROCEDURE — 84443 ASSAY THYROID STIM HORMONE: CPT

## 2024-12-14 PROCEDURE — 80061 LIPID PANEL: CPT

## 2024-12-14 PROCEDURE — 85027 COMPLETE CBC AUTOMATED: CPT

## 2024-12-14 PROCEDURE — 80076 HEPATIC FUNCTION PANEL: CPT

## 2024-12-14 PROCEDURE — 36415 COLL VENOUS BLD VENIPUNCTURE: CPT

## 2024-12-14 PROCEDURE — 82306 VITAMIN D 25 HYDROXY: CPT

## 2025-01-30 ENCOUNTER — INFUSION (OUTPATIENT)
Dept: INFUSION THERAPY | Facility: HOSPITAL | Age: 75
End: 2025-01-30
Attending: FAMILY MEDICINE
Payer: MEDICARE

## 2025-01-30 VITALS
HEIGHT: 61 IN | HEART RATE: 73 BPM | RESPIRATION RATE: 18 BRPM | TEMPERATURE: 98 F | DIASTOLIC BLOOD PRESSURE: 81 MMHG | WEIGHT: 141.13 LBS | BODY MASS INDEX: 26.65 KG/M2 | OXYGEN SATURATION: 98 % | SYSTOLIC BLOOD PRESSURE: 152 MMHG

## 2025-01-30 DIAGNOSIS — M81.0 OSTEOPOROSIS, POST-MENOPAUSAL: Primary | ICD-10-CM

## 2025-01-30 PROCEDURE — 63600175 PHARM REV CODE 636 W HCPCS: Mod: JZ,TB | Performed by: FAMILY MEDICINE

## 2025-01-30 PROCEDURE — 96372 THER/PROPH/DIAG INJ SC/IM: CPT

## 2025-01-30 RX ADMIN — DENOSUMAB 60 MG: 60 INJECTION SUBCUTANEOUS at 09:01

## 2025-06-09 ENCOUNTER — OFFICE VISIT (OUTPATIENT)
Dept: ORTHOPEDICS | Facility: CLINIC | Age: 75
End: 2025-06-09
Payer: MEDICARE

## 2025-06-09 VITALS
BODY MASS INDEX: 26.65 KG/M2 | SYSTOLIC BLOOD PRESSURE: 136 MMHG | HEART RATE: 72 BPM | DIASTOLIC BLOOD PRESSURE: 80 MMHG | HEIGHT: 61 IN | TEMPERATURE: 98 F | WEIGHT: 141.13 LBS

## 2025-06-09 DIAGNOSIS — M75.22 BICIPITAL TENDONITIS OF SHOULDER, LEFT: ICD-10-CM

## 2025-06-09 DIAGNOSIS — M25.512 LEFT SHOULDER PAIN, UNSPECIFIED CHRONICITY: Primary | ICD-10-CM

## 2025-06-09 DIAGNOSIS — M75.42 SHOULDER IMPINGEMENT SYNDROME, LEFT: ICD-10-CM

## 2025-06-09 DIAGNOSIS — M17.0 PRIMARY OSTEOARTHRITIS OF BOTH KNEES: ICD-10-CM

## 2025-06-09 PROCEDURE — 20610 DRAIN/INJ JOINT/BURSA W/O US: CPT | Mod: LT,,, | Performed by: ORTHOPAEDIC SURGERY

## 2025-06-09 PROCEDURE — 99214 OFFICE O/P EST MOD 30 MIN: CPT | Mod: 25,,, | Performed by: ORTHOPAEDIC SURGERY

## 2025-06-09 RX ADMIN — METHYLPREDNISOLONE ACETATE 80 MG: 80 INJECTION, SUSPENSION INTRA-ARTICULAR; INTRALESIONAL; INTRAMUSCULAR; SOFT TISSUE at 02:06

## 2025-06-09 RX ADMIN — LIDOCAINE HYDROCHLORIDE 2 MG: 20 INJECTION, SOLUTION INFILTRATION; PERINEURAL at 02:06

## 2025-06-09 NOTE — PROGRESS NOTES
"Subjective:    CC: Pain of the Right Knee (Patient is having r knee pain. Last inj 8/14/25) and Shoulder Pain (L shoulder pain. Patient started exercising with hand weights about 2 months ago. Pain Is positional. Patient has been icing with no relied. She is also left hand dominate. )       HPI:  Complaining of left shoulder pain.  Patient states she has been using some hand weights for the last couple of months.  She has been having some pain in certain positions of the shoulder.  She has tried rest medication without relief.    ROS: Refer to HPI for pertinent ROS. All other 12 point systems negative.    Objective:  Vitals:    06/09/25 1452 06/09/25 1453   BP: (!) 148/81 136/80   BP Location: Right arm Right arm   Patient Position: Sitting Sitting   Pulse: 83 72   Temp: 98.1 °F (36.7 °C)    Weight: 64 kg (141 lb 1.5 oz)    Height: 5' 1" (1.549 m)         Physical Exam:  Patient is well-nourished and well-developed, in no apparent distress, pleasant and cooperative. Examination of the left upper extremity compartments are soft and warm.  Skin is intact. There are no signs or symptoms of DVT or infection.   Patient is tender to palpation along the  lateral aspect .  Patient is able to forward flex and abduct to 140.  Positive Akins and Neers, positive empty can, negative drop arm test. Negative sulcus sign. Stable to stressing. Neurovascularly intact distally.    Images:  X-rays three views left shoulder demonstrate no obvious fracture or dislocation. Images Reviewed and discussed with patient.    Assessment:  1. Left shoulder pain, unspecified chronicity  - X-Ray Shoulder 2 or More Views Left; Future    2. Primary osteoarthritis of both knees    3. Bicipital tendonitis of shoulder, left  - Large Joint Aspiration/Injection: L subacromial bursa    4. Shoulder impingement syndrome, left  - Large Joint Aspiration/Injection: L subacromial bursa        Plan:  This time we discussed her physical exam and x-ray findings.  " We have discussed shoulder range of motion strengthening exercises, under sterile technique she tolerated a subacromial injection very well to left shoulder.  We have discussed an MRI she does not improve.    Follow UP: No follow-ups on file.    Large Joint Aspiration/Injection: L subacromial bursa    Date/Time: 6/9/2025 2:30 PM    Performed by: Nolan Pretty MD  Authorized by: Nolan Pretty MD    Consent Done?:  Yes (Verbal)  Indications:  Pain  Site marked: the procedure site was marked    Timeout: prior to procedure the correct patient, procedure, and site was verified    Prep: patient was prepped and draped in usual sterile fashion    Approach:  Posterior  Location:  Shoulder  Site:  L subacromial bursa  Medications:  2 mg LIDOcaine HCL 20 mg/ml (2%) 20 mg/mL (2 %); 80 mg methylPREDNISolone acetate 80 mg/mL  Patient tolerance:  Patient tolerated the procedure well with no immediate complications

## 2025-06-09 NOTE — PROCEDURES
Large Joint Aspiration/Injection: L subacromial bursa    Date/Time: 6/9/2025 2:30 PM    Performed by: Nolan Pretty MD  Authorized by: Nolan Pretty MD    Consent Done?:  Yes (Verbal)  Indications:  Pain  Site marked: the procedure site was marked    Timeout: prior to procedure the correct patient, procedure, and site was verified    Prep: patient was prepped and draped in usual sterile fashion    Approach:  Posterior  Location:  Shoulder  Site:  L subacromial bursa  Medications:  2 mg LIDOcaine HCL 20 mg/ml (2%) 20 mg/mL (2 %); 80 mg methylPREDNISolone acetate 80 mg/mL  Patient tolerance:  Patient tolerated the procedure well with no immediate complications

## 2025-06-10 ENCOUNTER — HOSPITAL ENCOUNTER (OUTPATIENT)
Dept: RADIOLOGY | Facility: HOSPITAL | Age: 75
Discharge: HOME | End: 2025-06-10
Attending: FAMILY MEDICINE
Payer: MEDICARE

## 2025-06-10 DIAGNOSIS — Z12.31 SCREENING MAMMOGRAM, ENCOUNTER FOR: ICD-10-CM

## 2025-06-10 PROCEDURE — 77067 SCR MAMMO BI INCL CAD: CPT | Mod: 26,,, | Performed by: RADIOLOGY

## 2025-06-10 PROCEDURE — 77067 SCR MAMMO BI INCL CAD: CPT | Mod: TC

## 2025-06-10 PROCEDURE — 77063 BREAST TOMOSYNTHESIS BI: CPT | Mod: 26,,, | Performed by: RADIOLOGY

## 2025-06-12 RX ORDER — LIDOCAINE HYDROCHLORIDE 20 MG/ML
2 INJECTION, SOLUTION INFILTRATION; PERINEURAL
Status: DISCONTINUED | OUTPATIENT
Start: 2025-06-09 | End: 2025-06-12 | Stop reason: HOSPADM

## 2025-06-12 RX ORDER — METHYLPREDNISOLONE ACETATE 80 MG/ML
80 INJECTION, SUSPENSION INTRA-ARTICULAR; INTRALESIONAL; INTRAMUSCULAR; SOFT TISSUE
Status: DISCONTINUED | OUTPATIENT
Start: 2025-06-09 | End: 2025-06-12 | Stop reason: HOSPADM

## 2025-07-01 DIAGNOSIS — H35.3230 BILATERAL EXUDATIVE AGE-RELATED MACULAR DEGENERATION, UNSPECIFIED STAGE: Primary | ICD-10-CM

## 2025-07-02 ENCOUNTER — OFFICE VISIT (OUTPATIENT)
Dept: ORTHOPEDICS | Facility: CLINIC | Age: 75
End: 2025-07-02
Payer: MEDICARE

## 2025-07-02 DIAGNOSIS — M17.0 PRIMARY OSTEOARTHRITIS OF BOTH KNEES: Primary | ICD-10-CM

## 2025-07-02 PROCEDURE — 99214 OFFICE O/P EST MOD 30 MIN: CPT | Mod: 25,,, | Performed by: ORTHOPAEDIC SURGERY

## 2025-07-02 PROCEDURE — 20610 DRAIN/INJ JOINT/BURSA W/O US: CPT | Mod: LT,,, | Performed by: ORTHOPAEDIC SURGERY

## 2025-07-02 RX ADMIN — METHYLPREDNISOLONE ACETATE 80 MG: 80 INJECTION, SUSPENSION INTRA-ARTICULAR; INTRALESIONAL; INTRAMUSCULAR; SOFT TISSUE at 01:07

## 2025-07-02 RX ADMIN — LIDOCAINE HYDROCHLORIDE 2 MG: 20 INJECTION, SOLUTION INFILTRATION; PERINEURAL at 01:07

## 2025-07-02 NOTE — PROGRESS NOTES
Subjective:    CC: Pain of the Left Knee (L knee pain. Pt states she's been having a lot of pain when bending and sleeping. Pain radiates up leg. Not doing home exercises. Has some swelling. No numbness or tingling. Taking tylenol PRN. Doesn't give out when she walks. No other concerns with it.)       HPI:  Patient returns today for repeat exam.  Patient continues to have some pain with her left knee especially with long standing walking and bending.  She has tried rest medication without relief.    ROS: Refer to HPI for pertinent ROS. All other 12 point systems negative.    Objective:  There were no vitals filed for this visit.     Physical Exam:  The patient is well-nourished, well-developed and in no apparent distress, pleasant and cooperative. Examination of the left lower extremity compartments are soft and warm. Skin is intact. There are no signs or symptoms of DVT or infection. There is no obvious joint effusion. There is no erythema. Tender to palpation along the medial and lateral joint line , left knee range of motion is 0-100. The knee is stable to exam with varus and valgus stressing. Negative anterior and posterior drawer. Negative Lachman´s.  Negative Rancho's test. Patella grind is positive, Negative for apprehension. Neurovascularly intact distally.    Images: . Images Reviewed and discussed with patient.    Assessment:  1. Primary osteoarthritis of both knees  - Large Joint Aspiration/Injection: L knee        Plan:  At this time we discussed her physical exam and previous imaging findings.  Patient we would like to continue conservative treatment.  Under sterile technique she tolerated the steroid injection very well through the anterolateral portal of the left knee.  He will continue low-impact activities, knee exercises.  I would like see him back with any problems or difficulties.    Follow UP: No follow-ups on file.    Large Joint Aspiration/Injection: L knee    Date/Time: 7/2/2025 1:00  PM    Performed by: Nolan Pretty MD  Authorized by: Nolan Pretty MD    Consent Done?:  Yes (Verbal)  Indications:  Pain  Site marked: the procedure site was marked    Prep: patient was prepped and draped in usual sterile fashion    Approach:  Anterolateral  Location:  Knee  Site:  L knee  Medications:  2 mg LIDOcaine HCL 20 mg/ml (2%) 20 mg/mL (2 %); 80 mg methylPREDNISolone acetate 80 mg/mL  Patient tolerance:  Patient tolerated the procedure well with no immediate complications

## 2025-07-02 NOTE — PROCEDURES
Large Joint Aspiration/Injection: L knee    Date/Time: 7/2/2025 1:00 PM    Performed by: Nolan Pretty MD  Authorized by: Nolan Pretty MD    Consent Done?:  Yes (Verbal)  Indications:  Pain  Site marked: the procedure site was marked    Prep: patient was prepped and draped in usual sterile fashion    Approach:  Anterolateral  Location:  Knee  Site:  L knee  Medications:  2 mg LIDOcaine HCL 20 mg/ml (2%) 20 mg/mL (2 %); 80 mg methylPREDNISolone acetate 80 mg/mL  Patient tolerance:  Patient tolerated the procedure well with no immediate complications

## 2025-07-09 RX ORDER — LIDOCAINE HYDROCHLORIDE 20 MG/ML
2 INJECTION, SOLUTION INFILTRATION; PERINEURAL
Status: DISCONTINUED | OUTPATIENT
Start: 2025-07-02 | End: 2025-07-09 | Stop reason: HOSPADM

## 2025-07-09 RX ORDER — METHYLPREDNISOLONE ACETATE 80 MG/ML
80 INJECTION, SUSPENSION INTRA-ARTICULAR; INTRALESIONAL; INTRAMUSCULAR; SOFT TISSUE
Status: DISCONTINUED | OUTPATIENT
Start: 2025-07-02 | End: 2025-07-09 | Stop reason: HOSPADM

## 2025-08-06 ENCOUNTER — LAB VISIT (OUTPATIENT)
Dept: LAB | Facility: HOSPITAL | Age: 75
End: 2025-08-06
Attending: FAMILY MEDICINE
Payer: MEDICARE

## 2025-08-06 DIAGNOSIS — M81.0 SENILE OSTEOPOROSIS: Primary | ICD-10-CM

## 2025-08-06 LAB — CALCIUM SERPL-MCNC: 9.7 MG/DL (ref 8.4–10.2)

## 2025-08-06 PROCEDURE — 36415 COLL VENOUS BLD VENIPUNCTURE: CPT

## 2025-08-06 PROCEDURE — 82310 ASSAY OF CALCIUM: CPT

## 2025-08-15 ENCOUNTER — INFUSION (OUTPATIENT)
Dept: INFUSION THERAPY | Facility: HOSPITAL | Age: 75
End: 2025-08-15
Attending: FAMILY MEDICINE
Payer: MEDICARE

## 2025-08-15 VITALS
HEIGHT: 61 IN | HEART RATE: 79 BPM | OXYGEN SATURATION: 98 % | DIASTOLIC BLOOD PRESSURE: 89 MMHG | BODY MASS INDEX: 26.65 KG/M2 | SYSTOLIC BLOOD PRESSURE: 142 MMHG | RESPIRATION RATE: 16 BRPM | TEMPERATURE: 98 F | WEIGHT: 141.13 LBS

## 2025-08-15 DIAGNOSIS — M81.0 OSTEOPOROSIS, POST-MENOPAUSAL: Primary | ICD-10-CM

## 2025-08-15 PROCEDURE — 63600175 PHARM REV CODE 636 W HCPCS: Mod: JZ,TB | Performed by: FAMILY MEDICINE

## 2025-08-15 PROCEDURE — 96372 THER/PROPH/DIAG INJ SC/IM: CPT

## 2025-08-15 RX ADMIN — DENOSUMAB 60 MG: 60 INJECTION SUBCUTANEOUS at 09:08

## 2025-08-21 ENCOUNTER — OFFICE VISIT (OUTPATIENT)
Dept: OPHTHALMOLOGY | Facility: CLINIC | Age: 75
End: 2025-08-21
Payer: MEDICARE

## 2025-08-21 VITALS — HEIGHT: 61 IN | WEIGHT: 141.13 LBS | BODY MASS INDEX: 26.65 KG/M2

## 2025-08-21 DIAGNOSIS — H53.411: ICD-10-CM

## 2025-08-21 DIAGNOSIS — H54.3 BLINDNESS OF RIGHT EYE WITH CATEGORY 3 BLINDNESS OF LEFT EYE: ICD-10-CM

## 2025-08-21 DIAGNOSIS — H35.3232 EXUDATIVE AGE-RELATED MACULAR DEGENERATION OF BOTH EYES WITH INACTIVE CHOROIDAL NEOVASCULARIZATION: Primary | ICD-10-CM

## 2025-08-21 DIAGNOSIS — H53.412 SCOTOMA INVOLVING CENTRAL AREA OF LEFT EYE: ICD-10-CM

## 2025-08-21 DIAGNOSIS — H52.7 REFRACTIVE ERROR: ICD-10-CM

## 2025-08-21 PROCEDURE — 99213 OFFICE O/P EST LOW 20 MIN: CPT | Mod: PBBFAC,PN

## 2025-08-21 RX ORDER — ERGOCALCIFEROL 1.25 MG/1
50000 CAPSULE ORAL
COMMUNITY

## 2025-08-21 RX ORDER — PREDNISONE 20 MG/1
20 TABLET ORAL EVERY MORNING
COMMUNITY
Start: 2025-03-27